# Patient Record
Sex: FEMALE | Race: WHITE | NOT HISPANIC OR LATINO
[De-identification: names, ages, dates, MRNs, and addresses within clinical notes are randomized per-mention and may not be internally consistent; named-entity substitution may affect disease eponyms.]

---

## 2018-05-21 PROBLEM — Z00.00 ENCOUNTER FOR PREVENTIVE HEALTH EXAMINATION: Status: ACTIVE | Noted: 2018-05-21

## 2018-06-13 ENCOUNTER — APPOINTMENT (OUTPATIENT)
Dept: UROLOGY | Facility: CLINIC | Age: 68
End: 2018-06-13
Payer: MEDICARE

## 2018-06-13 VITALS — TEMPERATURE: 99.2 F | DIASTOLIC BLOOD PRESSURE: 68 MMHG | SYSTOLIC BLOOD PRESSURE: 102 MMHG | HEART RATE: 102 BPM

## 2018-06-13 DIAGNOSIS — Z86.79 PERSONAL HISTORY OF OTHER DISEASES OF THE CIRCULATORY SYSTEM: ICD-10-CM

## 2018-06-13 DIAGNOSIS — Z87.891 PERSONAL HISTORY OF NICOTINE DEPENDENCE: ICD-10-CM

## 2018-06-13 PROCEDURE — 99204 OFFICE O/P NEW MOD 45 MIN: CPT

## 2018-06-13 RX ORDER — ATORVASTATIN CALCIUM 40 MG/1
40 TABLET, FILM COATED ORAL
Refills: 0 | Status: ACTIVE | COMMUNITY

## 2018-06-13 RX ORDER — ESCITALOPRAM OXALATE 20 MG/1
20 TABLET, FILM COATED ORAL
Refills: 0 | Status: ACTIVE | COMMUNITY

## 2018-06-13 RX ORDER — MONTELUKAST SODIUM 10 MG/1
10 TABLET, FILM COATED ORAL
Refills: 0 | Status: ACTIVE | COMMUNITY

## 2018-06-14 PROBLEM — Z86.79 HISTORY OF HYPERTENSION: Status: RESOLVED | Noted: 2018-06-14 | Resolved: 2018-06-14

## 2018-06-14 PROBLEM — Z87.891 FORMER SMOKER: Status: ACTIVE | Noted: 2018-06-14

## 2018-06-14 LAB
APPEARANCE: CLEAR
BACTERIA: NEGATIVE
BILIRUBIN URINE: NEGATIVE
BLOOD URINE: NEGATIVE
CALCIUM OXALATE CRYSTALS: ABNORMAL
COLOR: YELLOW
GLUCOSE QUALITATIVE U: NEGATIVE MG/DL
HYALINE CASTS: 1 /LPF
KETONES URINE: NEGATIVE
LEUKOCYTE ESTERASE URINE: ABNORMAL
MICROSCOPIC-UA: NORMAL
NITRITE URINE: NEGATIVE
PH URINE: 6
PROTEIN URINE: NEGATIVE MG/DL
RED BLOOD CELLS URINE: 2 /HPF
SPECIFIC GRAVITY URINE: 1.02
SQUAMOUS EPITHELIAL CELLS: 3 /HPF
UROBILINOGEN URINE: NEGATIVE MG/DL
WHITE BLOOD CELLS URINE: 6 /HPF

## 2018-06-18 LAB — BACTERIA UR CULT: ABNORMAL

## 2018-06-20 ENCOUNTER — FORM ENCOUNTER (OUTPATIENT)
Age: 68
End: 2018-06-20

## 2018-06-21 ENCOUNTER — OUTPATIENT (OUTPATIENT)
Dept: OUTPATIENT SERVICES | Facility: HOSPITAL | Age: 68
LOS: 1 days | End: 2018-06-21
Payer: MEDICARE

## 2018-06-21 ENCOUNTER — APPOINTMENT (OUTPATIENT)
Dept: ULTRASOUND IMAGING | Facility: HOSPITAL | Age: 68
End: 2018-06-21
Payer: MEDICARE

## 2018-06-21 PROCEDURE — 76770 US EXAM ABDO BACK WALL COMP: CPT | Mod: 26

## 2018-06-21 PROCEDURE — 76770 US EXAM ABDO BACK WALL COMP: CPT

## 2018-06-27 ENCOUNTER — APPOINTMENT (OUTPATIENT)
Dept: UROLOGY | Facility: CLINIC | Age: 68
End: 2018-06-27
Payer: MEDICARE

## 2018-06-27 VITALS — SYSTOLIC BLOOD PRESSURE: 111 MMHG | DIASTOLIC BLOOD PRESSURE: 77 MMHG | TEMPERATURE: 98.7 F | HEART RATE: 114 BPM

## 2018-06-27 PROCEDURE — 99213 OFFICE O/P EST LOW 20 MIN: CPT

## 2018-07-02 LAB — BACTERIA UR CULT: ABNORMAL

## 2018-07-18 ENCOUNTER — APPOINTMENT (OUTPATIENT)
Dept: UROLOGY | Facility: CLINIC | Age: 68
End: 2018-07-18
Payer: MEDICARE

## 2018-07-18 VITALS — HEART RATE: 108 BPM | SYSTOLIC BLOOD PRESSURE: 122 MMHG | TEMPERATURE: 98.6 F | DIASTOLIC BLOOD PRESSURE: 80 MMHG

## 2018-07-18 PROCEDURE — 52000 CYSTOURETHROSCOPY: CPT

## 2018-07-23 LAB
BACTERIA UR CULT: ABNORMAL
CORE LAB NON GYN CYTOLOGY TO LENOX HILL: NORMAL

## 2018-08-01 ENCOUNTER — APPOINTMENT (OUTPATIENT)
Dept: UROLOGY | Facility: CLINIC | Age: 68
End: 2018-08-01
Payer: MEDICARE

## 2018-08-01 VITALS
DIASTOLIC BLOOD PRESSURE: 78 MMHG | HEART RATE: 103 BPM | OXYGEN SATURATION: 98 % | BODY MASS INDEX: 34.66 KG/M2 | TEMPERATURE: 98.8 F | SYSTOLIC BLOOD PRESSURE: 111 MMHG | HEIGHT: 64 IN | WEIGHT: 203 LBS

## 2018-08-01 PROCEDURE — 99213 OFFICE O/P EST LOW 20 MIN: CPT

## 2018-08-01 RX ORDER — CIPROFLOXACIN HYDROCHLORIDE 500 MG/1
500 TABLET, FILM COATED ORAL
Qty: 14 | Refills: 0 | Status: DISCONTINUED | COMMUNITY
Start: 2018-07-18 | End: 2018-08-01

## 2018-08-01 RX ORDER — NITROFURANTOIN MACROCRYSTALS 100 MG/1
100 CAPSULE ORAL
Qty: 10 | Refills: 0 | Status: DISCONTINUED | COMMUNITY
Start: 2018-06-27 | End: 2018-08-01

## 2018-08-03 LAB — BACTERIA UR CULT: NORMAL

## 2018-08-15 ENCOUNTER — APPOINTMENT (OUTPATIENT)
Dept: UROLOGY | Facility: CLINIC | Age: 68
End: 2018-08-15

## 2018-10-15 ENCOUNTER — APPOINTMENT (OUTPATIENT)
Dept: UROLOGY | Facility: CLINIC | Age: 68
End: 2018-10-15
Payer: MEDICARE

## 2018-10-15 VITALS
BODY MASS INDEX: 34.66 KG/M2 | TEMPERATURE: 97.5 F | HEIGHT: 64 IN | SYSTOLIC BLOOD PRESSURE: 105 MMHG | WEIGHT: 203 LBS | OXYGEN SATURATION: 98 % | HEART RATE: 98 BPM | DIASTOLIC BLOOD PRESSURE: 73 MMHG

## 2018-10-15 PROCEDURE — 99213 OFFICE O/P EST LOW 20 MIN: CPT

## 2019-05-08 ENCOUNTER — APPOINTMENT (OUTPATIENT)
Dept: UROLOGY | Facility: CLINIC | Age: 69
End: 2019-05-08
Payer: MEDICARE

## 2019-05-08 VITALS — HEART RATE: 120 BPM | TEMPERATURE: 98.2 F | SYSTOLIC BLOOD PRESSURE: 114 MMHG | DIASTOLIC BLOOD PRESSURE: 76 MMHG

## 2019-05-08 PROCEDURE — 99212 OFFICE O/P EST SF 10 MIN: CPT

## 2019-05-09 NOTE — HISTORY OF PRESENT ILLNESS
[Dysuria] : no dysuria [FreeTextEntry1] : This is a 67yo female here for evaluation of recurrent UTI. She reports 4 UTI since 10/2017. The first three were not documented but were treated empirically. The most recent UTI was documented as E.coli. She was treated first with macrobid and most recently with Cipro the last 3 times. She has a history of kidney stones and was last treated by Dr. Flores in 2015. Currently feeling well. She has no baseline voiding symptoms. \par \par 6/27/18 Here for f/u. Urine culture showed 10-49K coag neg Staph but had no symptoms at last visit. Renal bladder US shows normal kidneys, approximately 3.5cm bladder diverticulum. She has started to feel recurrence of UTI symptoms in the last couple of days. \par \par 8/01/18 Here for f/u. Cystoscopy on 7/18 showed a moderate size bladder diverticulum with a large os. Urine culture grew E.coli. Now feeling better, no symptoms. \par \par 10/15/18 Here for f/u. No symptoms or UTI since last visit in August. No current complaints.\par \par 5/08/19 Here for f/u. No interval UTI or urinary symptoms.  [None] : None

## 2019-05-09 NOTE — PHYSICAL EXAM
[General Appearance - Well Developed] : well developed [General Appearance - Well Nourished] : well nourished [Normal Appearance] : normal appearance [Well Groomed] : well groomed [General Appearance - In No Acute Distress] : no acute distress [Abdomen Soft] : soft [Oriented To Time, Place, And Person] : oriented to person, place, and time [Abdomen Tenderness] : non-tender [Costovertebral Angle Tenderness] : no ~M costovertebral angle tenderness [Affect] : the affect was normal [Mood] : the mood was normal [Not Anxious] : not anxious [Normal Station and Gait] : the gait and station were normal for the patient's age [No Focal Deficits] : no focal deficits

## 2019-05-09 NOTE — ASSESSMENT
[FreeTextEntry1] : 67yo female with history of recurrent UTI with 4 symptomatic episodes in 2018\par Bladder diverticulum on cystoscopy but large os and should drain well \par Currently asymptomatic\par No interval UTI since August 2018\par F/u prn

## 2019-06-07 LAB
APPEARANCE: ABNORMAL
BACTERIA: ABNORMAL
BILIRUBIN URINE: NEGATIVE
BLOOD URINE: NORMAL
COLOR: YELLOW
GLUCOSE QUALITATIVE U: NEGATIVE
HYALINE CASTS: 0 /LPF
KETONES URINE: NEGATIVE
LEUKOCYTE ESTERASE URINE: ABNORMAL
MICROSCOPIC-UA: NORMAL
NITRITE URINE: POSITIVE
PH URINE: 7.5
PROTEIN URINE: ABNORMAL
RED BLOOD CELLS URINE: 4 /HPF
SPECIFIC GRAVITY URINE: 1.02
SQUAMOUS EPITHELIAL CELLS: 1 /HPF
TRIPLE PHOSPHATE CRYSTALS: ABNORMAL
UROBILINOGEN URINE: ABNORMAL
WHITE BLOOD CELLS URINE: >720 /HPF

## 2019-06-10 ENCOUNTER — RESULT REVIEW (OUTPATIENT)
Age: 69
End: 2019-06-10

## 2019-06-10 LAB — BACTERIA UR CULT: ABNORMAL

## 2019-07-10 LAB
APPEARANCE: ABNORMAL
BACTERIA: NEGATIVE
BILIRUBIN URINE: NEGATIVE
BLOOD URINE: NEGATIVE
CALCIUM OXALATE CRYSTALS: ABNORMAL
COLOR: YELLOW
GLUCOSE QUALITATIVE U: NEGATIVE
HYALINE CASTS: 1 /LPF
KETONES URINE: NEGATIVE
LEUKOCYTE ESTERASE URINE: ABNORMAL
MICROSCOPIC-UA: NORMAL
NITRITE URINE: NEGATIVE
PH URINE: 6
PROTEIN URINE: ABNORMAL
RED BLOOD CELLS URINE: 4 /HPF
SPECIFIC GRAVITY URINE: 1.02
SQUAMOUS EPITHELIAL CELLS: 1 /HPF
UROBILINOGEN URINE: NORMAL
WHITE BLOOD CELLS URINE: 309 /HPF

## 2019-07-12 LAB — BACTERIA UR CULT: ABNORMAL

## 2019-07-15 ENCOUNTER — RESULT REVIEW (OUTPATIENT)
Age: 69
End: 2019-07-15

## 2019-07-29 ENCOUNTER — APPOINTMENT (OUTPATIENT)
Dept: UROLOGY | Facility: CLINIC | Age: 69
End: 2019-07-29
Payer: MEDICARE

## 2019-08-14 ENCOUNTER — APPOINTMENT (OUTPATIENT)
Dept: UROLOGY | Facility: CLINIC | Age: 69
End: 2019-08-14
Payer: MEDICARE

## 2019-08-14 VITALS
TEMPERATURE: 98.7 F | WEIGHT: 203 LBS | BODY MASS INDEX: 34.66 KG/M2 | OXYGEN SATURATION: 97 % | SYSTOLIC BLOOD PRESSURE: 95 MMHG | HEIGHT: 64 IN | HEART RATE: 108 BPM | DIASTOLIC BLOOD PRESSURE: 70 MMHG

## 2019-08-14 PROCEDURE — 99213 OFFICE O/P EST LOW 20 MIN: CPT

## 2019-08-14 NOTE — HISTORY OF PRESENT ILLNESS
[FreeTextEntry1] : This is a 67yo female here for evaluation of recurrent UTI. She reports 4 UTI since 10/2017. The first three were not documented but were treated empirically. The most recent UTI was documented as E.coli. She was treated first with macrobid and most recently with Cipro the last 3 times. She has a history of kidney stones and was last treated by Dr. Flores in 2015. Currently feeling well. She has no baseline voiding symptoms. \par \par 6/27/18 Here for f/u. Urine culture showed 10-49K coag neg Staph but had no symptoms at last visit. Renal bladder US shows normal kidneys, approximately 3.5cm bladder diverticulum. She has started to feel recurrence of UTI symptoms in the last couple of days. \par \par 8/01/18 Here for f/u. Cystoscopy on 7/18 showed a moderate size bladder diverticulum with a large os. Urine culture grew E.coli. Now feeling better, no symptoms. \par \par 10/15/18 Here for f/u. No symptoms or UTI since last visit in August. No current complaints.\par \par 5/08/19 Here for f/u. No interval UTI or urinary symptoms. \par \par 8/14/19 Here for f/u. Had 2 interval UTI. No current symptoms.  [Dysuria] : no dysuria [None] : None

## 2019-08-14 NOTE — LETTER BODY
[Dear  ___] : Dear  [unfilled], [Courtesy Letter:] : I had the pleasure of seeing your patient, [unfilled], in my office today. [Please see my note below.] : Please see my note below. [Consult Closing:] : Thank you very much for allowing me to participate in the care of this patient.  If you have any questions, please do not hesitate to contact me. [Sincerely,] : Sincerely, [FreeTextEntry2] : Abdias Luo MD\par [address] [place] [address]\[place] [address]  [FreeTextEntry3] : Ellis Barone MD\par Urologic Oncology\par Department of Urology\par [facility]\par \par \par [employer] [facility] \par \par

## 2019-08-14 NOTE — ASSESSMENT
[FreeTextEntry1] : 68yo female with history of recurrent UTI \par Bladder diverticulum on cystoscopy but large os \par 2 UTI since May 2019\par Currently asymptomatic\par Check urine culture now\par Also h/o nephrolithiasis\par Check renal bladder US\par Discussed diverticulectomy if recurrent UTI\par F/u 2 months

## 2019-08-19 ENCOUNTER — FORM ENCOUNTER (OUTPATIENT)
Age: 69
End: 2019-08-19

## 2019-08-20 ENCOUNTER — APPOINTMENT (OUTPATIENT)
Dept: ULTRASOUND IMAGING | Facility: HOSPITAL | Age: 69
End: 2019-08-20
Payer: MEDICARE

## 2019-08-20 ENCOUNTER — OUTPATIENT (OUTPATIENT)
Dept: OUTPATIENT SERVICES | Facility: HOSPITAL | Age: 69
LOS: 1 days | End: 2019-08-20
Payer: MEDICARE

## 2019-08-20 PROCEDURE — 76770 US EXAM ABDO BACK WALL COMP: CPT

## 2019-08-20 PROCEDURE — 76770 US EXAM ABDO BACK WALL COMP: CPT | Mod: 26

## 2019-08-22 LAB — BACTERIA UR CULT: ABNORMAL

## 2019-08-26 ENCOUNTER — RESULT REVIEW (OUTPATIENT)
Age: 69
End: 2019-08-26

## 2019-11-18 ENCOUNTER — APPOINTMENT (OUTPATIENT)
Dept: UROLOGY | Facility: CLINIC | Age: 69
End: 2019-11-18
Payer: MEDICARE

## 2019-11-18 VITALS — TEMPERATURE: 98.7 F | HEART RATE: 116 BPM | SYSTOLIC BLOOD PRESSURE: 91 MMHG | DIASTOLIC BLOOD PRESSURE: 62 MMHG

## 2019-11-18 PROCEDURE — 51798 US URINE CAPACITY MEASURE: CPT

## 2019-11-18 PROCEDURE — 99213 OFFICE O/P EST LOW 20 MIN: CPT | Mod: 25

## 2019-11-18 NOTE — LETTER BODY
[Dear  ___] : Dear  [unfilled], [Courtesy Letter:] : I had the pleasure of seeing your patient, [unfilled], in my office today. [Please see my note below.] : Please see my note below. [Consult Closing:] : Thank you very much for allowing me to participate in the care of this patient.  If you have any questions, please do not hesitate to contact me. [FreeTextEntry2] : Abdias Luo MD\par [address] [place] [address]\[place] [address]  [Sincerely,] : Sincerely, [FreeTextEntry3] : Ellis Barone MD\par Urologic Oncology\par Department of Urology\par [facility]\par \par \par [employer] [facility] \par \par

## 2019-11-18 NOTE — PHYSICAL EXAM
[General Appearance - Well Developed] : well developed [General Appearance - Well Nourished] : well nourished [Normal Appearance] : normal appearance [Well Groomed] : well groomed [Abdomen Soft] : soft [General Appearance - In No Acute Distress] : no acute distress [Abdomen Tenderness] : non-tender [FreeTextEntry1] : random bladder scan = 136cc (about 2 hours post void) [Costovertebral Angle Tenderness] : no ~M costovertebral angle tenderness [Oriented To Time, Place, And Person] : oriented to person, place, and time [Mood] : the mood was normal [Affect] : the affect was normal [Normal Station and Gait] : the gait and station were normal for the patient's age [Not Anxious] : not anxious [No Focal Deficits] : no focal deficits

## 2019-11-18 NOTE — HISTORY OF PRESENT ILLNESS
[FreeTextEntry1] : This is a 67yo female here for evaluation of recurrent UTI. She reports 4 UTI since 10/2017. The first three were not documented but were treated empirically. The most recent UTI was documented as E.coli. She was treated first with macrobid and most recently with Cipro the last 3 times. She has a history of kidney stones and was last treated by Dr. Flores in 2015. Currently feeling well. She has no baseline voiding symptoms. \par \par 6/27/18 Here for f/u. Urine culture showed 10-49K coag neg Staph but had no symptoms at last visit. Renal bladder US shows normal kidneys, approximately 3.5cm bladder diverticulum. She has started to feel recurrence of UTI symptoms in the last couple of days. \par \par 8/01/18 Here for f/u. Cystoscopy on 7/18 showed a moderate size bladder diverticulum with a large os. Urine culture grew E.coli. Now feeling better, no symptoms. \par \par 10/15/18 Here for f/u. No symptoms or UTI since last visit in August. No current complaints.\par \par 5/08/19 Here for f/u. No interval UTI or urinary symptoms. \par \par 8/14/19 Here for f/u. Had 2 interval UTI. No current symptoms. \par \par 11/18/19 Here for f/u. No UTI since last visit 3 months ago. No current symptoms.  [Dysuria] : no dysuria [None] : None

## 2019-11-18 NOTE — ASSESSMENT
[FreeTextEntry1] : 70yo female with history of recurrent UTI, last in August 2019\par Bladder diverticulum on cystoscopy but large os \par Currently asymptomatic\par Check urine culture now\par Discussed diverticulectomy if recurrent UTI\par F/u 4 months

## 2019-11-19 LAB
APPEARANCE: ABNORMAL
BACTERIA: NEGATIVE
BILIRUBIN URINE: NEGATIVE
BLOOD URINE: NEGATIVE
CALCIUM OXALATE CRYSTALS: ABNORMAL
COLOR: YELLOW
GLUCOSE QUALITATIVE U: NEGATIVE
HYALINE CASTS: 0 /LPF
KETONES URINE: NEGATIVE
LEUKOCYTE ESTERASE URINE: NEGATIVE
MICROSCOPIC-UA: NORMAL
NITRITE URINE: NEGATIVE
PH URINE: 5
PROTEIN URINE: NEGATIVE
RED BLOOD CELLS URINE: 0 /HPF
SPECIFIC GRAVITY URINE: 1.02
SQUAMOUS EPITHELIAL CELLS: 2 /HPF
UROBILINOGEN URINE: NORMAL
WHITE BLOOD CELLS URINE: 3 /HPF

## 2019-11-20 LAB — BACTERIA UR CULT: NORMAL

## 2020-03-16 ENCOUNTER — APPOINTMENT (OUTPATIENT)
Dept: UROLOGY | Facility: CLINIC | Age: 70
End: 2020-03-16

## 2021-11-03 ENCOUNTER — APPOINTMENT (OUTPATIENT)
Dept: UROLOGY | Facility: CLINIC | Age: 71
End: 2021-11-03
Payer: MEDICARE

## 2021-11-03 VITALS
HEART RATE: 120 BPM | DIASTOLIC BLOOD PRESSURE: 70 MMHG | TEMPERATURE: 93.5 F | BODY MASS INDEX: 33.67 KG/M2 | HEIGHT: 63.5 IN | SYSTOLIC BLOOD PRESSURE: 111 MMHG | WEIGHT: 192.4 LBS

## 2021-11-03 DIAGNOSIS — N32.3 DIVERTICULUM OF BLADDER: ICD-10-CM

## 2021-11-03 PROCEDURE — 99213 OFFICE O/P EST LOW 20 MIN: CPT

## 2021-11-03 NOTE — LETTER BODY
[Dear  ___] : Dear  [unfilled], [Courtesy Letter:] : I had the pleasure of seeing your patient, [unfilled], in my office today. [Please see my note below.] : Please see my note below. [Consult Closing:] : Thank you very much for allowing me to participate in the care of this patient.  If you have any questions, please do not hesitate to contact me. [Sincerely,] : Sincerely, [FreeTextEntry2] : Abdias Luo MD\par [address] [place] [address]\[place] [address]  [FreeTextEntry3] : Ellis aBrone MD\par Urologic Oncology\par Department of Urology\par [facility] [place]\par \par \par [employer] [facility] \par \par

## 2021-11-03 NOTE — ASSESSMENT
[FreeTextEntry1] : 70yo female with history of recurrent UTI, last seen 2 years ago\par Known bladder diverticulum on cystoscopy but large os \par Currently with symptoms of UTI\par Will treat empirically \par Again discussed possibility of diverticulectomy \par Recommend eval at our [facility] to discuss other causes and treatment of recurrent UTI before contemplating surgery

## 2021-11-03 NOTE — HISTORY OF PRESENT ILLNESS
[FreeTextEntry1] : This is a 67yo female here for evaluation of recurrent UTI. She reports 4 UTI since 10/2017. The first three were not documented but were treated empirically. The most recent UTI was documented as E.coli. She was treated first with macrobid and most recently with Cipro the last 3 times. She has a history of kidney stones and was last treated by Dr. Flores in 2015. Currently feeling well. She has no baseline voiding symptoms. \par \par 6/27/18 Here for f/u. Urine culture showed 10-49K coag neg Staph but had no symptoms at last visit. Renal bladder US shows normal kidneys, approximately 3.5cm bladder diverticulum. She has started to feel recurrence of UTI symptoms in the last couple of days. \par \par 8/01/18 Here for f/u. Cystoscopy on 7/18 showed a moderate size bladder diverticulum with a large os. Urine culture grew E.coli. Now feeling better, no symptoms. \par \par 10/15/18 Here for f/u. No symptoms or UTI since last visit in August. No current complaints.\par \par 5/08/19 Here for f/u. No interval UTI or urinary symptoms. \par \par 8/14/19 Here for f/u. Had 2 interval UTI. No current symptoms. \par \par 11/18/19 Here for f/u. No UTI since last visit 3 months ago. No current symptoms. \par \par 11/3/21 Last seen 2 years ago due to pandemic. Currently with UTI symptoms, was treated 1 month ago with macrobid but did not help. No fever/chills/flank pain.  [Urinary Frequency] : urinary frequency [Straining] : no straining [Weak Stream] : no weak stream [Intermittency] : no intermittency [Dysuria] : dysuria [Abdominal Pain] : no abdominal pain [Flank Pain] : no flank pain [Fever] : no fever [Fatigue] : no fatigue [Nausea] : no nausea [None] : None

## 2021-11-04 ENCOUNTER — NON-APPOINTMENT (OUTPATIENT)
Age: 71
End: 2021-11-04

## 2021-11-07 ENCOUNTER — NON-APPOINTMENT (OUTPATIENT)
Age: 71
End: 2021-11-07

## 2021-11-08 LAB — BACTERIA UR CULT: NORMAL

## 2021-11-09 ENCOUNTER — NON-APPOINTMENT (OUTPATIENT)
Age: 71
End: 2021-11-09

## 2021-11-22 ENCOUNTER — APPOINTMENT (OUTPATIENT)
Dept: UROLOGY | Facility: CLINIC | Age: 71
End: 2021-11-22
Payer: MEDICARE

## 2021-11-22 VITALS
HEART RATE: 126 BPM | TEMPERATURE: 96.8 F | OXYGEN SATURATION: 95 % | DIASTOLIC BLOOD PRESSURE: 86 MMHG | SYSTOLIC BLOOD PRESSURE: 135 MMHG

## 2021-11-22 PROCEDURE — 51798 US URINE CAPACITY MEASURE: CPT

## 2021-11-22 PROCEDURE — 81003 URINALYSIS AUTO W/O SCOPE: CPT | Mod: QW

## 2021-11-22 PROCEDURE — 99214 OFFICE O/P EST MOD 30 MIN: CPT

## 2021-11-23 LAB
BILIRUB UR QL STRIP: NORMAL
CLARITY UR: CLEAR
COLLECTION METHOD: NORMAL
GLUCOSE UR-MCNC: NORMAL
HCG UR QL: 1 EU/DL
HGB UR QL STRIP.AUTO: NORMAL
KETONES UR-MCNC: NORMAL
LEUKOCYTE ESTERASE UR QL STRIP: NORMAL
NITRITE UR QL STRIP: NORMAL
PH UR STRIP: 5
PROT UR STRIP-MCNC: 30
SP GR UR STRIP: >=1.03

## 2021-11-24 ENCOUNTER — APPOINTMENT (OUTPATIENT)
Dept: UROLOGY | Facility: CLINIC | Age: 71
End: 2021-11-24

## 2021-11-26 LAB
BACTERIA UR CULT: NORMAL
BILIRUB UR QL STRIP: NORMAL
CLARITY UR: CLEAR
COLLECTION METHOD: NORMAL
GLUCOSE UR-MCNC: NORMAL
HCG UR QL: 1 EU/DL
HGB UR QL STRIP.AUTO: NORMAL
KETONES UR-MCNC: NORMAL
LEUKOCYTE ESTERASE UR QL STRIP: NORMAL
NITRITE UR QL STRIP: NORMAL
PH UR STRIP: 5
PROT UR STRIP-MCNC: 30
SP GR UR STRIP: >=1.03

## 2021-12-13 NOTE — ASSESSMENT
[FreeTextEntry1] : Pt is a 70 yo F with a hx of recurrent UTIs and urolithiasis -underwent ureteroscopy and ESWL procedures in the past.   Given her history, I have suggested she undergo a CT abd/pelvis with IV contrast to rule out a ureteral stricture.  \par \par Patient expressed understanding.

## 2021-12-13 NOTE — ADDENDUM
[FreeTextEntry1] : A portion of this note was written by [Viktoriya Monzon] on 11/22/2021 acting as a scribe for Dr. Winter. \par \par I have personally reviewed the chart and agree that the record accurately reflects my personal performance of the history, physical exam, assessment, and plan.

## 2021-12-13 NOTE — PHYSICAL EXAM
[General Appearance - Well Developed] : well developed [General Appearance - Well Nourished] : well nourished [Normal Appearance] : normal appearance [Well Groomed] : well groomed [General Appearance - In No Acute Distress] : no acute distress [Abdomen Soft] : soft [Abdomen Tenderness] : non-tender [Costovertebral Angle Tenderness] : no ~M costovertebral angle tenderness [Urinary Bladder Findings] : the bladder was normal on palpation [Oriented To Time, Place, And Person] : oriented to person, place, and time [Affect] : the affect was normal [Mood] : the mood was normal [Not Anxious] : not anxious [Normal Station and Gait] : the gait and station were normal for the patient's age [No Focal Deficits] : no focal deficits [FreeTextEntry1] : no prolapse

## 2021-12-13 NOTE — LETTER BODY
[Dear  ___] : Dear  [unfilled], [Consult Letter:] : I had the pleasure of evaluating your patient, [unfilled]. [Please see my note below.] : Please see my note below. [Consult Closing:] : Thank you very much for allowing me to participate in the care of this patient.  If you have any questions, please do not hesitate to contact me. [Sincerely,] : Sincerely, [FreeTextEntry3] : Dr. Patricia Winter \par \par

## 2021-12-13 NOTE — HISTORY OF PRESENT ILLNESS
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 aBrone.  She was recently treat with amoxicillin for UTI symptoms after a course of macrobid.  Her most recent culture was negative,11/3/21.  She had a calcium oxalate stone in 1988 and underwent ureteroscopy/laser lithotripsy.  She had a second stone in 2015 and underwent an ESWL and had a ureteral stent placed for 1 month.\par  \par Of note; She reports undergoing several "urethral and ureteral dilations" in the past. \par Sexually active. \par \par Udip: \par PVR: [] cc (to rule out incomplete bladder emptying)\par \par PMH: bladder diverticulum, nephrolithiasis, HTN\par PSH: ureteroscopy/lithotripsy  \par FH: none \par SH: former-smoker\par \par \par Allergies: morphine, prednisone, sulfa drugs\par Meds: amoxicillin, lexapro, atorvastatin, singulair, wellbutrin  \par \par Full Hx: This is a 67yo female here for evaluation of recurrent UTI. She reports 4 UTI since 10/2017. The first three were not documented but were treated empirically. The most recent UTI was documented as E.coli. She was treated first with macrobid and most recently with Cipro the last 3 times. She has a history of kidney stones and was last treated by Dr. Flores in 2015. Currently feeling well. She has no baseline voiding symptoms. \par \par 6/27/18 Here for f/u. Urine culture showed 10-49K coag neg Staph but had no symptoms at last visit. Renal bladder US shows normal kidneys, approximately 3.5cm bladder diverticulum. She has started to feel recurrence of UTI symptoms in the last couple of days. \par \par 8/01/18 Here for f/u. Cystoscopy on 7/18 showed a moderate size bladder diverticulum with a large os. Urine culture grew E.coli. Now feeling better, no symptoms. \par \par 10/15/18 Here for f/u. No symptoms or UTI since last visit in August. No current complaints.\par \par 5/08/19 Here for f/u. No interval UTI or urinary symptoms. \par \par 8/14/19 Here for f/u. Had 2 interval UTI. No current symptoms. \par \par 11/18/19 Here for f/u. No UTI since last visit 3 months ago. No current symptoms. \par \par 11/3/21 Last seen 2 years ago due to pandemic. Currently with UTI symptoms, was treated 1 month ago with macrobid but did not help. No fever/chills/flank pain.  [Straining] : no straining [Weak Stream] : no weak stream [Intermittency] : no intermittency [Abdominal Pain] : no abdominal pain [Flank Pain] : no flank pain [Fever] : no fever [Fatigue] : no fatigue [Nausea] : no nausea

## 2021-12-18 ENCOUNTER — OUTPATIENT (OUTPATIENT)
Dept: OUTPATIENT SERVICES | Facility: HOSPITAL | Age: 71
LOS: 1 days | End: 2021-12-18
Payer: MEDICARE

## 2021-12-18 ENCOUNTER — APPOINTMENT (OUTPATIENT)
Dept: CT IMAGING | Facility: HOSPITAL | Age: 71
End: 2021-12-18
Payer: MEDICARE

## 2021-12-18 PROCEDURE — G1004: CPT

## 2021-12-18 PROCEDURE — 74178 CT ABD&PLV WO CNTR FLWD CNTR: CPT | Mod: 26,ME

## 2021-12-18 PROCEDURE — 74178 CT ABD&PLV WO CNTR FLWD CNTR: CPT | Mod: ME

## 2022-01-05 ENCOUNTER — TRANSCRIPTION ENCOUNTER (OUTPATIENT)
Age: 72
End: 2022-01-05

## 2022-01-06 ENCOUNTER — APPOINTMENT (OUTPATIENT)
Dept: UROLOGY | Facility: CLINIC | Age: 72
End: 2022-01-06
Payer: MEDICARE

## 2022-01-06 PROCEDURE — ZZZZZ: CPT

## 2022-01-12 NOTE — ASSESSMENT
[FreeTextEntry1] : Pt is a 70 yo F with a hx of recurrent UTIs and urolithiasis -underwent ureteroscopy and ESWL procedures in the past.  Recent CT scan 12/18/21 showed evidence of a L nonobstructing 10 mm renal calculi, R nonobstructing 3 mm renal calculi, and a L sided bladder diverticula. Additionally, it was noted that a portion of the bladder, (anterior and superior bladder appears thick walled).  Pt will f/u for a cystoscopy for further evaluation.  At that time we will discuss management options for her left sided 10mm stone as well as preventative measures for her UTIs. \par \par Patient expressed understanding.

## 2022-01-12 NOTE — HISTORY OF PRESENT ILLNESS
[Other Location: e.g. School (Enter Location, City,State)___] : at [unfilled], at the time of the visit. 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 Barone. Pt returns today for a telehealth visit to discuss recent CT urogram results. She denies any current UTI-like symptoms, however in the last month she has been experiencing significant diarrhea. \par \par She was recently treat with amoxicillin for UTI symptoms after she failed a course of macrobid.  Her most recent culture was negative,11/3/21.  \par \par Of note, she had a calcium oxalate stone in 1988 and underwent ureteroscopy/laser lithotripsy.  She had a second stone in 2015 and underwent an ESWL and had a ureteral stent placed for 1 month.  She reports a remote hx of undergoing several "urethral and ureteral dilations" in the past. \par Sexually active. \par \par PMH: bladder diverticulum, nephrolithiasis, HTN\par PSH: ureteroscopy/lithotripsy  \par FH: none \par SH: former-smoker\par \par \par Allergies: morphine, prednisone, sulfa drugs\par Meds: amoxicillin, lexapro, atorvastatin, singulair, wellbutrin  \par \par Full Hx: This is a 67yo female here for evaluation of recurrent UTI. She reports 4 UTI since 10/2017. The first three were not documented but were treated empirically. The most recent UTI was documented as E.coli. She was treated first with macrobid and most recently with Cipro the last 3 times. She has a history of kidney stones and was last treated by Dr. Flores in 2015. Currently feeling well. She has no baseline voiding symptoms. \par \par 6/27/18 Here for f/u. Urine culture showed 10-49K coag neg Staph but had no symptoms at last visit. Renal bladder US shows normal kidneys, approximately 3.5cm bladder diverticulum. She has started to feel recurrence of UTI symptoms in the last couple of days. \par \par 8/01/18 Here for f/u. Cystoscopy on 7/18 showed a moderate size bladder diverticulum with a large os. Urine culture grew E.coli. Now feeling better, no symptoms. \par \par 10/15/18 Here for f/u. No symptoms or UTI since last visit in August. No current complaints.\par \par 5/08/19 Here for f/u. No interval UTI or urinary symptoms. \par \par 8/14/19 Here for f/u. Had 2 interval UTI. No current symptoms. \par \par 11/18/19 Here for f/u. No UTI since last visit 3 months ago. No current symptoms. \par \par 11/3/21 Last seen 2 years ago due to pandemic. Currently with UTI symptoms, was treated 1 month ago with macrobid but did not help. No fever/chills/flank pain.  [Straining] : no straining [Weak Stream] : no weak stream [Intermittency] : no intermittency [Abdominal Pain] : no abdominal pain [Flank Pain] : no flank pain [Fever] : no fever [Fatigue] : no fatigue [Nausea] : no nausea

## 2022-01-12 NOTE — ADDENDUM
[FreeTextEntry1] : A portion of this note was written by [Viktoriya Monzon] on 01/06/2022 acting as a scribe for Dr. Winter. \par \par I have personally reviewed the chart and agree that the record accurately reflects my personal performance of the history, physical exam, assessment, and plan.

## 2022-01-26 ENCOUNTER — APPOINTMENT (OUTPATIENT)
Dept: UROLOGY | Facility: CLINIC | Age: 72
End: 2022-01-26
Payer: MEDICARE

## 2022-01-26 VITALS
DIASTOLIC BLOOD PRESSURE: 91 MMHG | HEART RATE: 105 BPM | OXYGEN SATURATION: 96 % | SYSTOLIC BLOOD PRESSURE: 152 MMHG | TEMPERATURE: 97.6 F

## 2022-01-26 PROCEDURE — 81003 URINALYSIS AUTO W/O SCOPE: CPT | Mod: QW

## 2022-01-26 PROCEDURE — 52000 CYSTOURETHROSCOPY: CPT

## 2022-01-26 PROCEDURE — 99214 OFFICE O/P EST MOD 30 MIN: CPT | Mod: 25

## 2022-01-27 NOTE — ADDENDUM
[FreeTextEntry1] : A portion of this note was written by [Viktoriya Monzon] on 01/26/2022 acting as a scribe for Dr. Winter. \par \par I have personally reviewed the chart and agree that the record accurately reflects my personal performance of the history, physical exam, assessment, and plan.

## 2022-01-27 NOTE — ASSESSMENT
[FreeTextEntry1] : Pt is a 70 yo F with a hx of recurrent UTI's and urolithiasis -underwent ureteroscopy and ESWL procedures in the past.  Recent CT scan 12/18/21 showed evidence of a L nonobstructing 10 mm renal calculi, R nonobstructing 3 mm renal calculi, and a L sided bladder diverticula. \par \par Today's cystoscopy demonstrated left posterior wall wide mouth diverticulum with surrounding patchy erythema.\par We discussed her considerable stone burden on the left and the management options at length.  I advised her to electively undergo left ureteroscopy/laser lithotripsy stent placement, and she is in agreement.  She may have a stricture in the left ureter given the length of time Dr. Flores left a stent in the past, and we reviewed the post-op stent management as well.   We reviewed all risks/benefits and alternatives.   At that time I will biopsy the area of bladder inflammation, if still present.  We will conservatively manage the small right sided stone.  \par \par The surgical coordinator will reach out to her to discuss surgical dates and she will require clearance from her PCP. \par \par Today's urine was sent for culture. \par \par \par Patient expressed understanding.

## 2022-01-27 NOTE — HISTORY OF PRESENT ILLNESS
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 Barone. Pt returns today for a cystoscopy for further evaluation. She denies any current UTI-like symptoms, however in the last month she has been experiencing significant diarrhea. \par \par She was recently treat with amoxicillin for UTI symptoms after she failed a course of macrobid.  Her most recent culture was negative,11/3/21.  \par \par Of note, she had a calcium oxalate stone in 1988 and underwent ureteroscopy/laser lithotripsy.  She had a second stone in 2015 and underwent an ESWL and had a ureteral stent placed for 1 month.  She reports a remote hx of undergoing several "urethral and ureteral dilations" in the past. \par Sexually active. \par \par PMH: bladder diverticulum, nephrolithiasis, HTN\par PSH: ureteroscopy/lithotripsy  \par FH: none \par SH: former-smoker\par \par \par Allergies: morphine, prednisone, sulfa drugs\par Meds: amoxicillin, lexapro, atorvastatin, singulair, wellbutrin  \par \par Full Hx: This is a 69yo female here for evaluation of recurrent UTI. She reports 4 UTI since 10/2017. The first three were not documented but were treated empirically. The most recent UTI was documented as E.coli. She was treated first with macrobid and most recently with Cipro the last 3 times. She has a history of kidney stones and was last treated by Dr. Flores in 2015. Currently feeling well. She has no baseline voiding symptoms. \par \par 6/27/18 Here for f/u. Urine culture showed 10-49K coag neg Staph but had no symptoms at last visit. Renal bladder US shows normal kidneys, approximately 3.5cm bladder diverticulum. She has started to feel recurrence of UTI symptoms in the last couple of days. \par \par 8/01/18 Here for f/u. Cystoscopy on 7/18 showed a moderate size bladder diverticulum with a large os. Urine culture grew E.coli. Now feeling better, no symptoms. \par \par 10/15/18 Here for f/u. No symptoms or UTI since last visit in August. No current complaints.\par \par 5/08/19 Here for f/u. No interval UTI or urinary symptoms. \par \par 8/14/19 Here for f/u. Had 2 interval UTI. No current symptoms. \par \par 11/18/19 Here for f/u. No UTI since last visit 3 months ago. No current symptoms. \par \par 11/3/21 Last seen 2 years ago due to pandemic. Currently with UTI symptoms, was treated 1 month ago with macrobid but did not help. No fever/chills/flank pain.  [Straining] : no straining [Weak Stream] : no weak stream [Intermittency] : no intermittency [Abdominal Pain] : no abdominal pain [Flank Pain] : no flank pain [Fever] : no fever [Fatigue] : no fatigue [Nausea] : no nausea

## 2022-02-01 LAB — BACTERIA UR CULT: NORMAL

## 2022-02-02 ENCOUNTER — NON-APPOINTMENT (OUTPATIENT)
Age: 72
End: 2022-02-02

## 2022-02-07 LAB
BILIRUB UR QL STRIP: NORMAL
CLARITY UR: CLEAR
COLLECTION METHOD: NORMAL
GLUCOSE UR-MCNC: NORMAL
HCG UR QL: 1 EU/DL
HGB UR QL STRIP.AUTO: NORMAL
KETONES UR-MCNC: NORMAL
LEUKOCYTE ESTERASE UR QL STRIP: NORMAL
NITRITE UR QL STRIP: NORMAL
PH UR STRIP: 5.5
PROT UR STRIP-MCNC: 30
SP GR UR STRIP: >=1.03

## 2022-03-14 ENCOUNTER — NON-APPOINTMENT (OUTPATIENT)
Age: 72
End: 2022-03-14

## 2022-03-14 LAB — BACTERIA UR CULT: NORMAL

## 2022-03-17 LAB — SARS-COV-2 N GENE NPH QL NAA+PROBE: NOT DETECTED

## 2022-03-17 RX ORDER — ACETAMINOPHEN 500 MG
975 TABLET ORAL ONCE
Refills: 0 | Status: DISCONTINUED | OUTPATIENT
Start: 2022-03-21 | End: 2022-03-21

## 2022-03-18 NOTE — ASU PATIENT PROFILE, ADULT - ABLE TO REACH PT
Voicemail time and instructions given. no solid after 12mn on 3/20/2022clear liquid until 09:15 DOS/no

## 2022-03-18 NOTE — ASU PATIENT PROFILE, ADULT - NSICDXPASTMEDICALHX_GEN_ALL_CORE_FT
PAST MEDICAL HISTORY:  Anxiety     Asthma     Asthmatic bronchitis hospitalized [facility] 2010    Asthmatic bronchitis     Depression, major     GERD (gastroesophageal reflux disease)     H/O gastric ulcer     Heart burn     HTN (hypertension)     Hypercholesteremia     Kidney stones     Leaky heart valve pt unsure which valve, states her cardiologist told her it wasnt concerning.    Migraine     Palpitations pt states its stable, unchanging and sees cardiologist    Vertigo      PAST MEDICAL HISTORY:  Anxiety     Asthma     Asthmatic bronchitis hospitalized [facility] 2010    Asthmatic bronchitis     Depression, major     GERD (gastroesophageal reflux disease)     H/O gastric ulcer     Heart burn     History of hepatitis B 1980    HTN (hypertension)     Hypercholesteremia     Kidney stones     Leaky heart valve pt unsure which valve, states her cardiologist told her it wasnt concerning.    Migraine     Palpitations pt states its stable, unchanging and sees cardiologist    Vertigo

## 2022-03-18 NOTE — ASU PATIENT PROFILE, ADULT - FALL HARM RISK - HARM RISK INTERVENTIONS

## 2022-03-18 NOTE — ASU PATIENT PROFILE, ADULT - NSICDXPASTSURGICALHX_GEN_ALL_CORE_FT
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

## 2022-03-20 ENCOUNTER — TRANSCRIPTION ENCOUNTER (OUTPATIENT)
Age: 72
End: 2022-03-20

## 2022-03-21 ENCOUNTER — TRANSCRIPTION ENCOUNTER (OUTPATIENT)
Age: 72
End: 2022-03-21

## 2022-03-21 ENCOUNTER — RESULT REVIEW (OUTPATIENT)
Age: 72
End: 2022-03-21

## 2022-03-21 ENCOUNTER — APPOINTMENT (OUTPATIENT)
Dept: UROLOGY | Facility: HOSPITAL | Age: 72
End: 2022-03-21

## 2022-03-21 ENCOUNTER — OUTPATIENT (OUTPATIENT)
Dept: OUTPATIENT SERVICES | Facility: HOSPITAL | Age: 72
LOS: 1 days | Discharge: HOME | End: 2022-03-21
Payer: MEDICARE

## 2022-03-21 VITALS
TEMPERATURE: 98 F | OXYGEN SATURATION: 95 % | HEIGHT: 63 IN | SYSTOLIC BLOOD PRESSURE: 145 MMHG | HEART RATE: 88 BPM | DIASTOLIC BLOOD PRESSURE: 80 MMHG | WEIGHT: 184.09 LBS | RESPIRATION RATE: 16 BRPM

## 2022-03-21 VITALS
DIASTOLIC BLOOD PRESSURE: 83 MMHG | TEMPERATURE: 97 F | HEART RATE: 95 BPM | SYSTOLIC BLOOD PRESSURE: 154 MMHG | OXYGEN SATURATION: 96 % | RESPIRATION RATE: 15 BRPM

## 2022-03-21 DIAGNOSIS — Z86.69 PERSONAL HISTORY OF OTHER DISEASES OF THE NERVOUS SYSTEM AND SENSE ORGANS: Chronic | ICD-10-CM

## 2022-03-21 DIAGNOSIS — Z98.890 OTHER SPECIFIED POSTPROCEDURAL STATES: Chronic | ICD-10-CM

## 2022-03-21 DIAGNOSIS — Z87.19 PERSONAL HISTORY OF OTHER DISEASES OF THE DIGESTIVE SYSTEM: Chronic | ICD-10-CM

## 2022-03-21 DIAGNOSIS — N20.0 CALCULUS OF KIDNEY: ICD-10-CM

## 2022-03-21 PROCEDURE — 74420 UROGRAPHY RTRGR +-KUB: CPT | Mod: 26

## 2022-03-21 PROCEDURE — 88300 SURGICAL PATH GROSS: CPT | Mod: 26

## 2022-03-21 PROCEDURE — 88305 TISSUE EXAM BY PATHOLOGIST: CPT | Mod: 26

## 2022-03-21 PROCEDURE — 52356 CYSTO/URETERO W/LITHOTRIPSY: CPT | Mod: LT

## 2022-03-21 DEVICE — URETERAL CATH OPEN END 5FR 70CM: Type: IMPLANTABLE DEVICE | Status: FUNCTIONAL

## 2022-03-21 DEVICE — STONE BASKET ZEROTIP NITINOL 4-WIRE 1.9FR 120CM X 12MM: Type: IMPLANTABLE DEVICE | Status: FUNCTIONAL

## 2022-03-21 DEVICE — GUIDEWIRE SENSOR DUAL-FLEX NITINOL STRAIGHT .038" X 150CM: Type: IMPLANTABLE DEVICE | Status: FUNCTIONAL

## 2022-03-21 DEVICE — STONE EXTRACTOR NGAGE NITINOL 1.7FR 8MM: Type: IMPLANTABLE DEVICE | Status: FUNCTIONAL

## 2022-03-21 DEVICE — LASER FIBER FLEXIVA 365 HI POWER: Type: IMPLANTABLE DEVICE | Status: FUNCTIONAL

## 2022-03-21 DEVICE — GUIDEWIRE AMPLATZ SUPER-STIFF .038" X 145CM 3.5CM FLEXIBLE: Type: IMPLANTABLE DEVICE | Status: FUNCTIONAL

## 2022-03-21 DEVICE — STONE BASKET DAKOTA 1.9FR 8MMX120CM: Type: IMPLANTABLE DEVICE | Status: FUNCTIONAL

## 2022-03-21 DEVICE — URETERAL CATH OPEN END FLEXI-TIP 5FR .038" X 70CM: Type: IMPLANTABLE DEVICE | Status: FUNCTIONAL

## 2022-03-21 DEVICE — URETERAL SHEATH NAVIGATOR 11/13FR X 28CM: Type: IMPLANTABLE DEVICE | Status: FUNCTIONAL

## 2022-03-21 DEVICE — LASER FIBER FLEXIVA TRACTIP 200: Type: IMPLANTABLE DEVICE | Status: FUNCTIONAL

## 2022-03-21 DEVICE — GUIDEWIRE SENSOR DUAL-FLEX NITINOL STRAIGHT .035" X 150CM: Type: IMPLANTABLE DEVICE | Status: FUNCTIONAL

## 2022-03-21 DEVICE — URETEROSCOPE LITHOVUE DISP: Type: IMPLANTABLE DEVICE | Status: FUNCTIONAL

## 2022-03-21 DEVICE — URETERAL STENT TRIA SOFT 6FR 24MM: Type: IMPLANTABLE DEVICE | Status: FUNCTIONAL

## 2022-03-21 DEVICE — LASER FIBER SOLTIVE 200 BALL TIP: Type: IMPLANTABLE DEVICE | Status: FUNCTIONAL

## 2022-03-21 DEVICE — URETERAL CATH DUAL LUMEN 10FR 54CM: Type: IMPLANTABLE DEVICE | Status: FUNCTIONAL

## 2022-03-21 RX ORDER — OXYCODONE HYDROCHLORIDE 5 MG/1
5 TABLET ORAL ONCE
Refills: 0 | Status: DISCONTINUED | OUTPATIENT
Start: 2022-03-21 | End: 2022-03-21

## 2022-03-21 RX ORDER — ALBUTEROL 90 UG/1
0 AEROSOL, METERED ORAL
Qty: 0 | Refills: 0 | DISCHARGE

## 2022-03-21 RX ORDER — ACETAMINOPHEN 500 MG
650 TABLET ORAL ONCE
Refills: 0 | Status: DISCONTINUED | OUTPATIENT
Start: 2022-03-21 | End: 2022-03-21

## 2022-03-21 RX ORDER — BUPROPION HYDROCHLORIDE 150 MG/1
1 TABLET, EXTENDED RELEASE ORAL
Qty: 0 | Refills: 0 | DISCHARGE

## 2022-03-21 RX ORDER — ONDANSETRON 8 MG/1
4 TABLET, FILM COATED ORAL ONCE
Refills: 0 | Status: DISCONTINUED | OUTPATIENT
Start: 2022-03-21 | End: 2022-03-21

## 2022-03-21 RX ORDER — FENTANYL CITRATE 50 UG/ML
25 INJECTION INTRAVENOUS ONCE
Refills: 0 | Status: DISCONTINUED | OUTPATIENT
Start: 2022-03-21 | End: 2022-03-21

## 2022-03-21 RX ORDER — ESCITALOPRAM OXALATE 10 MG/1
1 TABLET, FILM COATED ORAL
Qty: 0 | Refills: 0 | DISCHARGE

## 2022-03-21 RX ORDER — ROSUVASTATIN CALCIUM 5 MG/1
1 TABLET ORAL
Qty: 0 | Refills: 0 | DISCHARGE

## 2022-03-21 RX ORDER — OXYCODONE AND ACETAMINOPHEN 5; 325 MG/1; MG/1
1 TABLET ORAL ONCE
Refills: 0 | Status: DISCONTINUED | OUTPATIENT
Start: 2022-03-21 | End: 2022-03-21

## 2022-03-21 RX ORDER — MONTELUKAST 4 MG/1
1 TABLET, CHEWABLE ORAL
Qty: 0 | Refills: 0 | DISCHARGE

## 2022-03-21 RX ORDER — SODIUM CHLORIDE 9 MG/ML
1000 INJECTION, SOLUTION INTRAVENOUS
Refills: 0 | Status: DISCONTINUED | OUTPATIENT
Start: 2022-03-21 | End: 2022-03-21

## 2022-03-21 RX ADMIN — OXYCODONE HYDROCHLORIDE 5 MILLIGRAM(S): 5 TABLET ORAL at 16:25

## 2022-03-21 RX ADMIN — OXYCODONE HYDROCHLORIDE 5 MILLIGRAM(S): 5 TABLET ORAL at 16:50

## 2022-03-21 NOTE — ASU PREOP CHECKLIST - ORDERS/MEDICATION ADMINISTRATION RECORD ON CHART
Pt could not swallow PO Tylenol, anesthesia called and made aware, plan to administer IV Tylenol in OR/done

## 2022-03-21 NOTE — BRIEF OPERATIVE NOTE - NSICDXBRIEFPROCEDURE_GEN_ALL_CORE_FT
PROCEDURES:  Cystourethroscopy, with ureteroscopy, laser lithotripsy, and calculus removal 21-Mar-2022 15:34:31  Francisco Luna  Biopsy of bladder 21-Mar-2022 15:34:47  Francisco Luna

## 2022-03-21 NOTE — ASU PREOP CHECKLIST - 1.
Pt could not swallow PO Tylenol, anesthesia called and made aware, plan to administer IV Tylenol in OR

## 2022-03-21 NOTE — ASU PREOP CHECKLIST - TEMPERATURE IN CELSIUS (DEGREES C)
2/2pm newly febrile to 100.6F  -UA+  -CXR negative for new infection, notable for known mets  -fu BCx, RVP, UCx  -started empiric vanc and Zosyn  -ID Dr. Gomez consulted 36.7

## 2022-03-21 NOTE — ASU DISCHARGE PLAN (ADULT/PEDIATRIC) - CARE PROVIDER_API CALL
Patricia Winter)  Urology  [address] [place] [id number]  Phone: [phone]  Fax: [phone]  Follow Up Time:

## 2022-03-21 NOTE — ASU DISCHARGE PLAN (ADULT/PEDIATRIC) - NS MD DC FALL RISK RISK
For information on Fall & Injury Prevention, visit: [email or web address] OR  [email or web address] OR  https://www.cdc.gov/steadi/patient.html

## 2022-03-21 NOTE — BRIEF OPERATIVE NOTE - OPERATION/FINDINGS
Cystoscopy with Left ureteroscopy, laser lithotripsy, stone basketing, and ureteral stent insertion. Biopsy of L bladder dome and L diverticulum erythematous mucosa

## 2022-03-21 NOTE — ASU DISCHARGE PLAN (ADULT/PEDIATRIC) - ASU DC SPECIAL INSTRUCTIONSFT
GENERAL: It is common to have blood in your urine after your procedure. It may be pink or even red; and it is important to increase fluid intake to 2-3L of water per day to keep the urine as clear as possible. Please inform your doctor if you have a significant amount of clot in the urine or if you are unable to void at all. The urine may clear and then become bloody again especially as you are more physically active.    STENT: You may have an internal stent (a hollow tube that runs from the kidney to your bladder) after your procedure, which helps urine drain from the kidney to your bladder. Some patients experience urinary frequency, burning, or even back pain (especially with urination). These sensations will gradually get better. Increasing your fluid intake can also improve these symptoms. While the stent is in place, your urine may continue to be bloody. This stent is temporary and must be removed by your urologist as an outpatient with in 3 months unless otherwise specified.    PAIN: You may take Tylenol (acetaminophen) 650-975mg for pain every 6 hours as needed. Do not exceed 4000mg of Tylenol (acetaminophen) daily.     BATHING: You may shower or bathe.    DIET: You may resume your regular diet and regular medication regimen.    ACTIVITY: you may resume normal activity    CALL YOUR UROLOGIST IF: You have any bleeding that does not stop, inability to void >8 hours, fever over 100.4 F, chills, persistent nausea/vomiting, changes in your incision concerning for infection, or if your pain is not controlled on your discharge pain medications.

## 2022-03-22 PROBLEM — F41.9 ANXIETY DISORDER, UNSPECIFIED: Chronic | Status: ACTIVE | Noted: 2022-03-21

## 2022-03-22 PROBLEM — R00.2 PALPITATIONS: Chronic | Status: ACTIVE | Noted: 2022-03-21

## 2022-03-22 PROBLEM — R12 HEARTBURN: Chronic | Status: ACTIVE | Noted: 2022-03-21

## 2022-03-22 PROBLEM — J45.909 UNSPECIFIED ASTHMA, UNCOMPLICATED: Chronic | Status: ACTIVE | Noted: 2022-03-21

## 2022-03-22 PROBLEM — E78.00 PURE HYPERCHOLESTEROLEMIA, UNSPECIFIED: Chronic | Status: ACTIVE | Noted: 2022-03-21

## 2022-03-22 PROBLEM — N20.0 CALCULUS OF KIDNEY: Chronic | Status: ACTIVE | Noted: 2022-03-21

## 2022-03-22 PROBLEM — I38 ENDOCARDITIS, VALVE UNSPECIFIED: Chronic | Status: ACTIVE | Noted: 2022-03-21

## 2022-03-22 PROBLEM — K21.9 GASTRO-ESOPHAGEAL REFLUX DISEASE WITHOUT ESOPHAGITIS: Chronic | Status: ACTIVE | Noted: 2022-03-21

## 2022-03-22 PROBLEM — F32.9 MAJOR DEPRESSIVE DISORDER, SINGLE EPISODE, UNSPECIFIED: Chronic | Status: ACTIVE | Noted: 2022-03-21

## 2022-03-22 PROBLEM — G43.909 MIGRAINE, UNSPECIFIED, NOT INTRACTABLE, WITHOUT STATUS MIGRAINOSUS: Chronic | Status: ACTIVE | Noted: 2022-03-21

## 2022-03-22 PROBLEM — R42 DIZZINESS AND GIDDINESS: Chronic | Status: ACTIVE | Noted: 2022-03-21

## 2022-03-22 PROBLEM — Z86.19 PERSONAL HISTORY OF OTHER INFECTIOUS AND PARASITIC DISEASES: Chronic | Status: ACTIVE | Noted: 2022-03-21

## 2022-03-22 PROBLEM — I10 ESSENTIAL (PRIMARY) HYPERTENSION: Chronic | Status: ACTIVE | Noted: 2022-03-21

## 2022-03-22 PROBLEM — Z87.11 PERSONAL HISTORY OF PEPTIC ULCER DISEASE: Chronic | Status: ACTIVE | Noted: 2022-03-21

## 2022-03-25 LAB
-  CEFAZOLIN: SIGNIFICANT CHANGE UP
-  LINEZOLID: SIGNIFICANT CHANGE UP
-  NITROFURANTOIN: SIGNIFICANT CHANGE UP
-  OXACILLIN: SIGNIFICANT CHANGE UP
-  RIFAMPIN: SIGNIFICANT CHANGE UP
-  TRIMETHOPRIM/SULFAMETHOXAZOLE: SIGNIFICANT CHANGE UP
-  VANCOMYCIN: SIGNIFICANT CHANGE UP
CULTURE RESULTS: SIGNIFICANT CHANGE UP
METHOD TYPE: SIGNIFICANT CHANGE UP
ORGANISM # SPEC MICROSCOPIC CNT: SIGNIFICANT CHANGE UP
ORGANISM # SPEC MICROSCOPIC CNT: SIGNIFICANT CHANGE UP
SPECIMEN SOURCE: SIGNIFICANT CHANGE UP

## 2022-03-28 LAB — SURGICAL PATHOLOGY STUDY: SIGNIFICANT CHANGE UP

## 2022-03-29 ENCOUNTER — APPOINTMENT (OUTPATIENT)
Dept: UROLOGY | Facility: CLINIC | Age: 72
End: 2022-03-29
Payer: MEDICARE

## 2022-03-29 VITALS
SYSTOLIC BLOOD PRESSURE: 121 MMHG | TEMPERATURE: 97.1 F | OXYGEN SATURATION: 95 % | DIASTOLIC BLOOD PRESSURE: 81 MMHG | HEART RATE: 104 BPM

## 2022-03-29 LAB
BILIRUB UR QL STRIP: NORMAL
CLARITY UR: CLEAR
COLLECTION METHOD: NORMAL
GLUCOSE UR-MCNC: NORMAL
HCG UR QL: 1 EU/DL
HGB UR QL STRIP.AUTO: NORMAL
KETONES UR-MCNC: NORMAL
LEUKOCYTE ESTERASE UR QL STRIP: NORMAL
NIDUS STONE QN: SIGNIFICANT CHANGE UP
NITRITE UR QL STRIP: NORMAL
PH UR STRIP: 6
PROT UR STRIP-MCNC: 100
SP GR UR STRIP: >=1.03

## 2022-03-29 PROCEDURE — 81003 URINALYSIS AUTO W/O SCOPE: CPT | Mod: QW

## 2022-03-29 PROCEDURE — 52310 CYSTOSCOPY AND TREATMENT: CPT

## 2022-03-29 PROCEDURE — 99024 POSTOP FOLLOW-UP VISIT: CPT

## 2022-04-01 LAB — BACTERIA UR CULT: NORMAL

## 2022-04-04 NOTE — ASSESSMENT
[FreeTextEntry1] : Pt is a 72 yo F with a hx of recurrent UTI's and urolithiasis -underwent ureteroscopy and ESWL procedures in the past.  Pt is now s/p 3/21/22 L ureteroscopy and bladder biopsy here today for left ureteral stent removal.   Left stent removed uneventfully.  \par \par We reviewed her pathology from the bladder biopsies which demonstrated inflammatory changes and was negative for malignancy.  II will obtain her stone chemical analysis to review. Pt will complete a 24 hour urine collection and schedule a virtual visit to discuss the results.   \par \par Today's urine was sent for culture. \par \par Patient expressed understanding.

## 2022-04-04 NOTE — ADDENDUM
[FreeTextEntry1] : A portion of this note was written by [Viktoriya Monzon] on 03/29/2022 acting as a scribe for Dr. Winter. \par \par I have personally reviewed the chart and agree that the record accurately reflects my personal performance of the history, physical exam, assessment, and plan.

## 2022-04-04 NOTE — HISTORY OF PRESENT ILLNESS
[Urinary Frequency] : urinary frequency [Dysuria] : dysuria [None] : None [FreeTextEntry1] : Pt is a 72 yo female s/p 3/21/22 L ureteroscopy/laser lithotripsy and bladder biopsies who presents today for a L stent removal. \par \par Udip: (+) large blood, pro., small jessee.\par \par Recent hx:\par She denies any current UTI-like symptoms, however in the last month she has been experiencing significant diarrhea. \par She was recently treat with amoxicillin for UTI symptoms after she failed a course of macrobid.  Her most recent culture was negative,11/3/21.  \par \par Of note, she had a calcium oxalate stone in 1988 and underwent ureteroscopy/laser lithotripsy.  She had a second stone in 2015 and underwent an ESWL and had a ureteral stent placed for 1 month.  She reports a remote hx of undergoing several "urethral and ureteral dilations" in the past. \par Sexually active. \par \par PMH: bladder diverticulum, nephrolithiasis, HTN\par PSH: ureteroscopy/lithotripsy  \par FH: none \par SH: former-smoker\par \par \par Allergies: morphine, prednisone, sulfa drugs\par Meds: amoxicillin, lexapro, atorvastatin, singulair, wellbutrin  \par \par Full Hx: This is a 67yo female here for evaluation of recurrent UTI. She reports 4 UTI since 10/2017. The first three were not documented but were treated empirically. The most recent UTI was documented as E.coli. She was treated first with macrobid and most recently with Cipro the last 3 times. She has a history of kidney stones and was last treated by Dr. Flores in 2015. Currently feeling well. She has no baseline voiding symptoms. \par \par 6/27/18 Here for f/u. Urine culture showed 10-49K coag neg Staph but had no symptoms at last visit. Renal bladder US shows normal kidneys, approximately 3.5cm bladder diverticulum. She has started to feel recurrence of UTI symptoms in the last couple of days. \par \par 8/01/18 Here for f/u. Cystoscopy on 7/18 showed a moderate size bladder diverticulum with a large os. Urine culture grew E.coli. Now feeling better, no symptoms. \par \par 10/15/18 Here for f/u. No symptoms or UTI since last visit in August. No current complaints.\par \par 5/08/19 Here for f/u. No interval UTI or urinary symptoms. \par \par 8/14/19 Here for f/u. Had 2 interval UTI. No current symptoms. \par \par 11/18/19 Here for f/u. No UTI since last visit 3 months ago. No current symptoms. \par \par 11/3/21 Last seen 2 years ago due to pandemic. Currently with UTI symptoms, was treated 1 month ago with macrobid but did not help. No fever/chills/flank pain.  [Straining] : no straining [Weak Stream] : no weak stream [Intermittency] : no intermittency [Abdominal Pain] : no abdominal pain [Flank Pain] : no flank pain [Fever] : no fever [Fatigue] : no fatigue [Nausea] : no nausea

## 2022-04-28 ENCOUNTER — APPOINTMENT (OUTPATIENT)
Dept: UROLOGY | Facility: CLINIC | Age: 72
End: 2022-04-28
Payer: MEDICARE

## 2022-04-28 PROCEDURE — ZZZZZ: CPT

## 2022-05-17 ENCOUNTER — APPOINTMENT (OUTPATIENT)
Dept: NEPHROLOGY | Facility: CLINIC | Age: 72
End: 2022-05-17
Payer: MEDICARE

## 2022-05-17 VITALS — DIASTOLIC BLOOD PRESSURE: 80 MMHG | HEART RATE: 84 BPM | SYSTOLIC BLOOD PRESSURE: 126 MMHG

## 2022-05-17 PROCEDURE — 99205 OFFICE O/P NEW HI 60 MIN: CPT

## 2022-05-17 NOTE — ASSESSMENT
[FreeTextEntry1] : all lab data was reviewed with patient in detail from EHR\par 72 yo woman with recurrent kidney stones, HTN, UTIs, hyperoxaluria, hypocitraturia, prediabetes and diarrhea\par \par -kidney stones- reviewed need to increase U volume to generate > 2L UO daily- now at .88 L- instructed to see GI to get her bowel issue addressed- will have a difficult time generating 2L UO if having high GI losses- also GI losses can contribute increased GI oxalate absorption \par -hyperoxaluria-  needs to reduce oxalate consumption- written material provided\par -hypocitraturia- cannot tolerate potassium citrate tabs, offer KCitrate crystals and moonstone (samples provided)- she will try the moonstone- and keep me apprised\par -HTN- BP controlled- \par will need to start home BP monitoring at one poin;t consider wrist cuff since she offers that upper arm cuff is painful.\par -prediabetes counseled to reduce CHO intake and exercise- start with daily walking \par \par repeat lithholink 2 months- \par f/u OV 2 months.\par did advise that it usually takes 4-6 months to gt her fluid intake and diet to where it needs to be\par \par

## 2022-05-17 NOTE — CONSULT LETTER
[Dear  ___] : Dear ~FREDERICK, [Please see my note below.] : Please see my note below. [Consult Closing:] : Thank you very much for allowing me to participate in the care of this patient.  If you have any questions, please do not hesitate to contact me. [FreeTextEntry2] : Patricia Winter MD [FreeTextEntry1] : We had a detailed conversation about increasing her urine output, reducing her oxalate intake and the need to add citrate. I will keep you apprised of her progress.  [FreeTextEntry3] : Best personal regards,\par Lluvia\par \par Lluvia Soto MD, FACP\par Professor of Medicine\par [facility] [employer] [facility]\par \par

## 2022-05-17 NOTE — HISTORY OF PRESENT ILLNESS
[FreeTextEntry1] : 70 yo woman here for further evaluation and management of kidney stones, recurrent UTIs.\par First stone in 1988, CaOx underwent ureteroscopy with laser lithotripsy; 2nd stone,  2015 treated with ESWL and ureteral stent; 3rd 3/2022- s/p left ureteroscopy and laser lithotripsy and stent x 1 month.\rashi has frequent UTIs- recently treated with macrobid by Dr. Winter; also reports that she has needed urethral dilations \par offers that she has diarrhea frequently- no dx of crohns or UC-  no formal GI dx- she is aware that she needs to see a gastroenterologist\par +HTN and prediabetes.\par no cancers or thyroid disease.\par Denies flank pain, dysuria, hematuria or frothy urine \par no CP, SOB, N, V

## 2022-05-31 NOTE — ASSESSMENT
[FreeTextEntry1] : Pt is a 70 yo F with a hx of recurrent UTI's and urolithiasis -underwent ureteroscopy and ESWL procedures in the past.  24 hour urine collection reviewed today- hypocit, hyperoxal, low urine volume.   I have suggested she see Dr. Prabhakar from Nephrology, which the pt has agreed to do. \par \par Today's urine was sent for culture. \par \par Patient expressed understanding.

## 2022-05-31 NOTE — HISTORY OF PRESENT ILLNESS
[Other Location: e.g. School (Enter Location, City,State)___] : at [unfilled], at the time of the visit. 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  She presents today via telehealth to discuss recent 24 hour urine collection results.  24 hour urine demonstrates extremely low urine volume, hyperoxaluria, hypocitraturia. \par \par She was recently treat with amoxicillin for UTI symptoms after she failed a course of macrobid.  Her most recent culture was negative,11/3/21.  \par \par Of note, she had a calcium oxalate stone in 1988 and underwent ureteroscopy/laser lithotripsy.  She had a second stone in 2015 and underwent an ESWL and had a ureteral stent placed for 1 month.  She reports a remote hx of undergoing several "urethral and ureteral dilations" in the past. \par Sexually active. \par \par PMH: bladder diverticulum, nephrolithiasis, HTN\par PSH: ureteroscopy/lithotripsy  \par FH: none \par SH: former-smoker\par \par \par Allergies: morphine, prednisone, sulfa drugs\par Meds: amoxicillin, lexapro, atorvastatin, singulair, wellbutrin  \par \par Full Hx: This is a 69yo female here for evaluation of recurrent UTI. She reports 4 UTI since 10/2017. The first three were not documented but were treated empirically. The most recent UTI was documented as E.coli. She was treated first with macrobid and most recently with Cipro the last 3 times. She has a history of kidney stones and was last treated by Dr. Flores in 2015. Currently feeling well. She has no baseline voiding symptoms. \par \par 6/27/18 Here for f/u. Urine culture showed 10-49K coag neg Staph but had no symptoms at last visit. Renal bladder US shows normal kidneys, approximately 3.5cm bladder diverticulum. She has started to feel recurrence of UTI symptoms in the last couple of days. \par \par 8/01/18 Here for f/u. Cystoscopy on 7/18 showed a moderate size bladder diverticulum with a large os. Urine culture grew E.coli. Now feeling better, no symptoms. \par \par 10/15/18 Here for f/u. No symptoms or UTI since last visit in August. No current complaints.\par \par 5/08/19 Here for f/u. No interval UTI or urinary symptoms. \par \par 8/14/19 Here for f/u. Had 2 interval UTI. No current symptoms. \par \par 11/18/19 Here for f/u. No UTI since last visit 3 months ago. No current symptoms. \par \par 11/3/21 Last seen 2 years ago due to pandemic. Currently with UTI symptoms, was treated 1 month ago with macrobid but did not help. No fever/chills/flank pain.  [Urinary Frequency] : urinary frequency [Straining] : no straining [Weak Stream] : no weak stream [Intermittency] : no intermittency [Dysuria] : dysuria [Abdominal Pain] : no abdominal pain [Flank Pain] : no flank pain [Fever] : no fever [Fatigue] : no fatigue [Nausea] : no nausea [None] : None

## 2022-06-21 ENCOUNTER — APPOINTMENT (OUTPATIENT)
Dept: UROLOGY | Facility: CLINIC | Age: 72
End: 2022-06-21

## 2022-07-11 ENCOUNTER — APPOINTMENT (OUTPATIENT)
Dept: NEPHROLOGY | Facility: CLINIC | Age: 72
End: 2022-07-11

## 2022-07-11 VITALS — DIASTOLIC BLOOD PRESSURE: 73 MMHG | SYSTOLIC BLOOD PRESSURE: 125 MMHG

## 2022-07-11 DIAGNOSIS — N39.0 URINARY TRACT INFECTION, SITE NOT SPECIFIED: ICD-10-CM

## 2022-07-11 PROCEDURE — 99214 OFFICE O/P EST MOD 30 MIN: CPT

## 2022-07-11 RX ORDER — ERYTHROMYCIN 5 MG/G
5 OINTMENT OPHTHALMIC
Qty: 4 | Refills: 0 | Status: DISCONTINUED | COMMUNITY
Start: 2022-06-12

## 2022-07-11 RX ORDER — OLOPATADINE HCL 1 MG/ML
0.1 SOLUTION/ DROPS OPHTHALMIC
Qty: 5 | Refills: 0 | Status: DISCONTINUED | COMMUNITY
Start: 2022-06-12

## 2022-07-11 NOTE — ASSESSMENT
[FreeTextEntry1] : litholink data reviewed from 6/9/2022\par 73 yo woman with recurrent kidney stones, HTN, UTIs, hyperoxaluria, hypocitraturia, prediabetes and diarrhea\par -kidney stones- reviewed urine volume improving to 1.8L however still need to increase fluid intake to generate >2L/day\par -hyperoxaluria-  needs to reduce oxalate consumption- written material provided\par -hypocitraturia- improving from 313 to 777 on moonstone- would preer even higher- to monitor\par -HTN- BP controlled- on enalapril\par \par repeat lithholink 2 months\par f/u OV 2 months.\par \par \par

## 2022-07-11 NOTE — HISTORY OF PRESENT ILLNESS
[FreeTextEntry1] : 73 yo woman with recurrent UTI's and kidney stones here for further evaluation \par recent UTI around 2 weeks ago completed a 5 day course of ciprofloxacin\par trying to drink at least 1.5L/day\par has been on the moonstone powder for the last two months (once a day) \par First stone in 1988, CaOx underwent ureteroscopy with laser lithotripsy; 2nd stone,  2015 treated with ESWL and ureteral stent; 3rd 3/2022- s/p left ureteroscopy and laser lithotripsy and stent x 1 month.\par no further kidney stone attacks since 3/2022\par still having frequent diarrhea- to schedule colonoscopy very soon\par +HTN and prediabetes.\par \par Denies flank pain, dysuria, hematuria or frothy urine \par no CP, SOB, N, V

## 2022-12-02 ENCOUNTER — OFFICE (OUTPATIENT)
Dept: URBAN - METROPOLITAN AREA CLINIC 28 | Facility: CLINIC | Age: 72
Setting detail: OPHTHALMOLOGY
End: 2022-12-02
Payer: MEDICARE

## 2022-12-02 DIAGNOSIS — H16.223: ICD-10-CM

## 2022-12-02 DIAGNOSIS — H02.401: ICD-10-CM

## 2022-12-02 DIAGNOSIS — H52.4: ICD-10-CM

## 2022-12-02 DIAGNOSIS — H02.89: ICD-10-CM

## 2022-12-02 PROBLEM — H52.7 REFRACTIVE ERROR ; BOTH EYES: Status: ACTIVE | Noted: 2022-12-02

## 2022-12-02 PROCEDURE — 92015 DETERMINE REFRACTIVE STATE: CPT | Performed by: OPTOMETRIST

## 2022-12-02 PROCEDURE — 99213 OFFICE O/P EST LOW 20 MIN: CPT | Performed by: OPTOMETRIST

## 2022-12-02 ASSESSMENT — REFRACTION_MANIFEST
OS_CYLINDER: -1.00
OD_ADD: +2.75
OS_ADD: +2.75
OS_SPHERE: -2.25
OD_SPHERE: -2.00
OS_CYLINDER: -1.25
OD_SPHERE: -2.00
OS_SPHERE: -1.75
OD_CYLINDER: -0.50
OD_AXIS: 055
OD_ADD: +2.75
OS_SPHERE: -2.50
OS_AXIS: 120
OD_AXIS: 065
OD_SPHERE: -1.50
OS_AXIS: 120
OD_VA1: 20/20
OS_ADD: +2.75
OS_CYLINDER: -1.00
OD_AXIS: 055
OD_CYLINDER: -0.50
OD_VA1: 20/20-
OD_CYLINDER: -0.75
OD_VA1: 20/20
OS_VA1: 20/70
OS_AXIS: 120

## 2022-12-02 ASSESSMENT — REFRACTION_CURRENTRX
OD_ADD: +2.75
OD_SPHERE: -1.50
OS_OVR_VA: 20/
OS_CYLINDER: -1.25
OS_SPHERE: -2.25
OD_AXIS: 062
OD_CYLINDER: -0.75
OD_OVR_VA: 20/
OD_AXIS: 068
OS_CYLINDER: -1.25
OD_CYLINDER: -0.50
OS_ADD: +2.75
OS_ADD: +2.75
OS_SPHERE: -1.75
OD_SPHERE: -1.00
OD_OVR_VA: 20/
OD_ADD: +2.75
OS_OVR_VA: 20/
OS_AXIS: 120
OS_AXIS: 137

## 2022-12-02 ASSESSMENT — AXIALLENGTH_DERIVED
OS_AL: 25.0078
OS_AL: 25.3968
OD_AL: 24.8031
OD_AL: 24.696
OD_AL: 24.8031
OS_AL: 25.2845
OD_AL: 24.6427
OS_AL: 25.3405

## 2022-12-02 ASSESSMENT — SPHEQUIV_DERIVED
OS_SPHEQUIV: -3.125
OD_SPHEQUIV: -2
OS_SPHEQUIV: -3
OD_SPHEQUIV: -2.25
OD_SPHEQUIV: -1.875
OD_SPHEQUIV: -2.25
OS_SPHEQUIV: -2.875
OS_SPHEQUIV: -2.25

## 2022-12-02 ASSESSMENT — REFRACTION_AUTOREFRACTION
OS_AXIS: 111
OD_AXIS: 056
OS_SPHERE: -2.50
OD_SPHERE: -1.75
OS_CYLINDER: -1.25
OD_CYLINDER: -0.50

## 2022-12-02 ASSESSMENT — VISUAL ACUITY
OS_BCVA: 20/20-1
OD_BCVA: 20/100+1

## 2022-12-02 ASSESSMENT — KERATOMETRY
OS_AXISANGLE_DEGREES: 065
OD_K1POWER_DIOPTERS: 41.00
OS_K2POWER_DIOPTERS: 43.50
OD_AXISANGLE_DEGREES: 100
OS_K1POWER_DIOPTERS: 41.00
OD_K2POWER_DIOPTERS: 44.50

## 2022-12-02 ASSESSMENT — LID EXAM ASSESSMENTS
OD_MEIBOMITIS: RLL RUL T 1+
OS_MEIBOMITIS: LLL LUL T 1+

## 2022-12-02 ASSESSMENT — SUPERFICIAL PUNCTATE KERATITIS (SPK): OS_SPK: T

## 2022-12-02 ASSESSMENT — LID POSITION - PTOSIS: OD_PTOSIS: RUL 1+ 2+

## 2023-01-25 ENCOUNTER — APPOINTMENT (OUTPATIENT)
Dept: NEPHROLOGY | Facility: CLINIC | Age: 73
End: 2023-01-25
Payer: MEDICARE

## 2023-01-25 VITALS — HEART RATE: 86 BPM | SYSTOLIC BLOOD PRESSURE: 118 MMHG | DIASTOLIC BLOOD PRESSURE: 80 MMHG

## 2023-01-25 PROCEDURE — 99214 OFFICE O/P EST MOD 30 MIN: CPT

## 2023-01-25 RX ORDER — BUPROPION HYDROCHLORIDE 150 MG/1
150 TABLET, FILM COATED, EXTENDED RELEASE ORAL TWICE DAILY
Qty: 44 | Refills: 0 | Status: ACTIVE | COMMUNITY

## 2023-01-25 RX ORDER — AMOXICILLIN AND CLAVULANATE POTASSIUM 875; 125 MG/1; MG/1
875-125 TABLET, COATED ORAL
Qty: 10 | Refills: 0 | Status: DISCONTINUED | COMMUNITY
Start: 2019-06-10 | End: 2023-01-25

## 2023-01-25 RX ORDER — AMOXICILLIN AND CLAVULANATE POTASSIUM 200; 28.5 MG/5ML; MG/5ML
200-28.5 POWDER, FOR SUSPENSION ORAL TWICE DAILY
Qty: 1 | Refills: 0 | Status: DISCONTINUED | COMMUNITY
Start: 2021-11-04 | End: 2023-01-25

## 2023-01-25 RX ORDER — POTASSIUM CITRATE 10 MEQ/1
10 MEQ TABLET, EXTENDED RELEASE ORAL TWICE DAILY
Qty: 120 | Refills: 11 | Status: DISCONTINUED | COMMUNITY
Start: 2022-04-28 | End: 2023-01-25

## 2023-01-25 RX ORDER — AMOXICILLIN AND CLAVULANATE POTASSIUM 875; 125 MG/1; MG/1
875-125 TABLET, COATED ORAL
Qty: 14 | Refills: 0 | Status: DISCONTINUED | COMMUNITY
Start: 2019-07-12 | End: 2023-01-25

## 2023-01-25 RX ORDER — AMOXICILLIN AND CLAVULANATE POTASSIUM 875; 125 MG/1; MG/1
875-125 TABLET, COATED ORAL
Qty: 10 | Refills: 0 | Status: DISCONTINUED | COMMUNITY
Start: 2021-11-03 | End: 2023-01-25

## 2023-01-25 RX ORDER — AMOXICILLIN AND CLAVULANATE POTASSIUM 875; 125 MG/1; MG/1
875-125 TABLET, COATED ORAL
Qty: 10 | Refills: 0 | Status: DISCONTINUED | COMMUNITY
Start: 2019-08-28 | End: 2023-01-25

## 2023-01-25 RX ORDER — AMOXICILLIN AND CLAVULANATE POTASSIUM 875; 125 MG/1; MG/1
875-125 TABLET, COATED ORAL
Qty: 10 | Refills: 0 | Status: DISCONTINUED | COMMUNITY
Start: 2022-02-02 | End: 2023-01-25

## 2023-01-25 NOTE — HISTORY OF PRESENT ILLNESS
[FreeTextEntry1] : 71 yo woman here for f/u evaluation of recurrent UTI's and kidney stones.\par +HTN and prediabetes.\par no recent recurrent UTIs or renal colic \par since last OV has realized that she was consuming chocolate almond milk- dc'd- \par also enjoying moonstone kidney beverage \par colonoscopy last month- NAP other than diverticular disease\par recently noticed BP low side, even feeling faint at times- held enalapril today\par weight 168.8- down from 190s in 2021\par Denies flank pain, dysuria, hematuria or frothy urine \par no CP, SOB, N, V\par \par First stone in 1988, CaOx underwent ureteroscopy with laser lithotripsy; 2nd stone,  2015 treated with ESWL and ureteral stent; 3rd 3/2022- s/p left ureteroscopy and laser lithotripsy and stent x 1 month.\par no further kidney stone attacks since 3/2022\par \par \par \par

## 2023-01-25 NOTE — PHYSICAL EXAM
[General Appearance - Alert] : alert [General Appearance - In No Acute Distress] : in no acute distress [] : no respiratory distress [Heart Rate And Rhythm] : heart rate was normal and rhythm regular [Auscultation Breath Sounds / Voice Sounds] : lungs were clear to auscultation bilaterally [Heart Sounds] : normal S1 and S2 [Heart Sounds Gallop] : no gallops [Murmurs] : no murmurs [Heart Sounds Pericardial Friction Rub] : no pericardial rub [Edema] : there was no peripheral edema [Oriented To Time, Place, And Person] : oriented to person, place, and time [Impaired Insight] : insight and judgment were intact [Affect] : the affect was normal

## 2023-01-25 NOTE — ASSESSMENT
[FreeTextEntry1] : all lab data was reviewed with patient in detail from 10/26/2022\par arm circumference now 12"- okay to use regular size cuff\par 73 yo woman with recurrent kidney stones, HTN, UTIs, hyperoxaluria, hypocitraturia, prediabetes.\par -kidney stones- reduced oxalate intake- added moonstone\par repeat 24H litholink collection 2-3 months and OV here after that to review\par -hyperoxaluria- reduced oxalate consumption-  to re quantify\par -hypocitraturia- consuming moonstone- to re quantifyr\par -HTN- BP on low side and has been dizzy- was holding the enalapril- agree\par continue to hold until BP > 135/85 then resume at 10 mg daily (1/2 tab of 20 mg) \par keep me apprised of BP readings\par \par \par f/u end April/begin May to review 24H urine report\par \par \par

## 2023-01-30 ENCOUNTER — NON-APPOINTMENT (OUTPATIENT)
Age: 73
End: 2023-01-30

## 2023-04-27 ENCOUNTER — APPOINTMENT (OUTPATIENT)
Dept: NEPHROLOGY | Facility: CLINIC | Age: 73
End: 2023-04-27
Payer: MEDICARE

## 2023-04-27 VITALS — DIASTOLIC BLOOD PRESSURE: 76 MMHG | HEART RATE: 84 BPM | SYSTOLIC BLOOD PRESSURE: 114 MMHG

## 2023-04-27 PROCEDURE — 99214 OFFICE O/P EST MOD 30 MIN: CPT

## 2023-04-27 NOTE — PHYSICAL EXAM
[General Appearance - Alert] : alert [General Appearance - In No Acute Distress] : in no acute distress [] : no respiratory distress [Auscultation Breath Sounds / Voice Sounds] : lungs were clear to auscultation bilaterally [Heart Rate And Rhythm] : heart rate was normal and rhythm regular [Heart Sounds] : normal S1 and S2 [Heart Sounds Gallop] : no gallops [Murmurs] : no murmurs [Heart Sounds Pericardial Friction Rub] : no pericardial rub [Oriented To Time, Place, And Person] : oriented to person, place, and time [Edema] : there was no peripheral edema [Impaired Insight] : insight and judgment were intact [Affect] : the affect was normal

## 2023-04-30 RX ORDER — ENALAPRIL MALEATE 20 MG/1
20 TABLET ORAL
Refills: 0 | Status: DISCONTINUED | COMMUNITY
End: 2023-04-30

## 2023-04-30 NOTE — ASSESSMENT
[FreeTextEntry1] : all lab data was reviewed with patient in detail from 4/4/2023\par 71 yo woman with recurrent kidney stones, HTN, UTIs, hyperoxaluria, hypocitraturia, prediabetes.\par -kidney stones- citrate level improving, but can improve further\par Uvol 1.85- suboptimal- needs to consume more water\par instructed to add another packet of moonstone daily -eg 2 portions help try to get more fluid in (not 2 packets at 1 time) \par -hypercalciuria- Ucalcium 245 reduce ca intake by 3-5 portions weekly- eg less yogurt and/or milk and/or cheese.\par defer HCTZ for now\par - hyperoxaluria- Uoxalate 46 reviewed low oxalate diet\par -HTN- BP controlled and < 125\par suggested she try reducing enalapril to 10 mg daily- she will consider\par repeat 9-10/2023 with f/u OV 10/23\par \par \par repeat litholink 4 months\par f/u end Oct/begin NOV to review 24H urine report\par \par \par

## 2023-04-30 NOTE — HISTORY OF PRESENT ILLNESS
[FreeTextEntry1] : 71 yo woman with recurrent UTI's and kidney stones, here for follow up evaluation.\par no recent UTIs; \par last OV BPs on low side instructed to hold enalapril until  BP > 135/85 then restart at 10 mg daily (1/2 tab of 20 mg) did restart but is using full 20 mg tab -No repeat episode of dizziness\par +HTN and prediabetes.\par continues on moonstone- "cranberry-raspberry the best!!"\par weight 170.2\par Denies flank pain, dysuria, hematuria or frothy urine \par no CP, SOB, N, V\par \par First stone in 1988, CaOx underwent ureteroscopy with laser lithotripsy; 2nd stone,  2015 treated with ESWL and ureteral stent; 3rd 3/2022- s/p left ureteroscopy and laser lithotripsy and stent x 1 month.\par no further kidney stone attacks since 3/2022\par \par ADDENDUM: 4/30/23- pt notified me that he is taking 10 mg tabs of Enalapril \par \par

## 2023-10-30 ENCOUNTER — APPOINTMENT (OUTPATIENT)
Dept: NEPHROLOGY | Facility: CLINIC | Age: 73
End: 2023-10-30
Payer: MEDICARE

## 2023-10-30 VITALS — DIASTOLIC BLOOD PRESSURE: 68 MMHG | HEART RATE: 80 BPM | SYSTOLIC BLOOD PRESSURE: 122 MMHG

## 2023-10-30 VITALS — WEIGHT: 168 LBS | BODY MASS INDEX: 29.29 KG/M2

## 2023-10-30 DIAGNOSIS — N20.0 CALCULUS OF KIDNEY: ICD-10-CM

## 2023-10-30 DIAGNOSIS — R82.992 HYPEROXALURIA: ICD-10-CM

## 2023-10-30 DIAGNOSIS — R82.991 HYPOCITRATURIA: ICD-10-CM

## 2023-10-30 DIAGNOSIS — I10 ESSENTIAL (PRIMARY) HYPERTENSION: ICD-10-CM

## 2023-10-30 PROCEDURE — 99214 OFFICE O/P EST MOD 30 MIN: CPT

## 2023-10-30 RX ORDER — ENALAPRIL MALEATE 10 MG/1
10 TABLET ORAL
Qty: 90 | Refills: 3 | Status: ACTIVE | COMMUNITY

## 2023-11-01 PROBLEM — R82.992 HYPEROXALURIA: Status: ACTIVE | Noted: 2022-05-17

## 2023-11-01 PROBLEM — R82.991 HYPOCITRATURIA: Status: ACTIVE | Noted: 2022-05-17

## 2023-11-01 PROBLEM — N20.0 NEPHROLITHIASIS: Status: ACTIVE | Noted: 2018-06-13

## 2023-11-01 PROBLEM — I10 HTN (HYPERTENSION), BENIGN: Status: ACTIVE | Noted: 2022-07-11

## 2023-11-06 NOTE — LETTER BODY
[Dear  ___] : Dear  [unfilled], [Courtesy Letter:] : I had the pleasure of seeing your patient, [unfilled], in my office today. [Please see my note below.] : Please see my note below. Biopsy Photograph Reviewed: Yes [Consult Closing:] : Thank you very much for allowing me to participate in the care of this patient.  If you have any questions, please do not hesitate to contact me. [FreeTextEntry2] : Abdias Luo MD\par [address] [place] [address]\[place] [address]  [FreeTextEntry3] : Ellis Barone MD\par Urologic Oncology\par Department of Urology\par [facility]\par \par \par [employer] [facility] \par \par  [Sincerely,] : Sincerely,

## 2024-08-26 ENCOUNTER — APPOINTMENT (OUTPATIENT)
Dept: NEPHROLOGY | Facility: CLINIC | Age: 74
End: 2024-08-26

## 2025-01-31 NOTE — ASU PATIENT PROFILE, ADULT - DOMESTIC TRAVEL HIGH RISK QUESTION
Form received at [facility] on 1/31/2025.    Please note that it takes 7-10 business days for completion.     Authorization on file; enc 01-20-25    No

## (undated) DEVICE — SOL IRR BAG NS 0.9% 3000ML

## (undated) DEVICE — WARMING BLANKET UPPER ADULT

## (undated) DEVICE — DRAPE C ARM 41X74"

## (undated) DEVICE — DRAPE TOWEL BLUE 17" X 24"

## (undated) DEVICE — TUBING RANGER FLUID IRRIGATION SET DISP

## (undated) DEVICE — GLV 6.5 PROTEXIS (WHITE)

## (undated) DEVICE — ELCTR PLASMA LOOP MEDIUM 24FR 12-30 DEG

## (undated) DEVICE — ELCTR PLASMA BUTTON OVAL 24FR 12-30 DEG

## (undated) DEVICE — BOSTON SCIENTIFIC UROLOK II SCOPE ADAPTOR

## (undated) DEVICE — PACK CYSTO

## (undated) DEVICE — VENODYNE/SCD SLEEVE CALF MEDIUM